# Patient Record
Sex: FEMALE | Race: OTHER | HISPANIC OR LATINO | URBAN - METROPOLITAN AREA
[De-identification: names, ages, dates, MRNs, and addresses within clinical notes are randomized per-mention and may not be internally consistent; named-entity substitution may affect disease eponyms.]

---

## 2019-12-19 ENCOUNTER — OFFICE (OUTPATIENT)
Dept: URBAN - METROPOLITAN AREA CLINIC 33 | Facility: CLINIC | Age: 54
End: 2019-12-19

## 2019-12-19 VITALS
WEIGHT: 190 LBS | HEIGHT: 64 IN | TEMPERATURE: 97.8 F | SYSTOLIC BLOOD PRESSURE: 124 MMHG | DIASTOLIC BLOOD PRESSURE: 90 MMHG | HEART RATE: 64 BPM

## 2019-12-19 DIAGNOSIS — R12 HEARTBURN: ICD-10-CM

## 2019-12-19 DIAGNOSIS — R09.89 OTHER SPECIFIED SYMPTOMS AND SIGNS INVOLVING THE CIRCULATORY: ICD-10-CM

## 2019-12-19 DIAGNOSIS — R06.6 HICCOUGH: ICD-10-CM

## 2019-12-19 PROCEDURE — 99203 OFFICE O/P NEW LOW 30 MIN: CPT

## 2019-12-19 NOTE — SERVICEHPINOTES
ANA DE LA TORRE   is a   54   year old    female who is being seen in consultation at the request of   DAIANA DAVID   for hiccups after eating solids for the past 3 years. She has noted heartburn in the evenings for the past 3 years. She takes OTC antacids which helps with symptoms. She has had occasional choking sensation after eating or drinking ("like something goes down the wrong way). No abdominal pain, nausea, vomiting, melena, rectal bleeding, loss of appetite, weight loss, constipation, diarrhea, hoarseness of voice. She had a colonoscopy 5 years ago and was told to come back in 5 years. She has never had an EGD.

## 2019-12-23 ENCOUNTER — ON CAMPUS - OUTPATIENT (OUTPATIENT)
Dept: URBAN - METROPOLITAN AREA HOSPITAL 14 | Facility: HOSPITAL | Age: 54
End: 2019-12-23

## 2019-12-23 DIAGNOSIS — R12 HEARTBURN: ICD-10-CM

## 2019-12-23 DIAGNOSIS — K29.60 OTHER GASTRITIS WITHOUT BLEEDING: ICD-10-CM

## 2019-12-23 DIAGNOSIS — R09.89 OTHER SPECIFIED SYMPTOMS AND SIGNS INVOLVING THE CIRCULATORY: ICD-10-CM

## 2019-12-23 DIAGNOSIS — R06.6 HICCOUGH: ICD-10-CM

## 2019-12-23 PROCEDURE — 43239 EGD BIOPSY SINGLE/MULTIPLE: CPT
